# Patient Record
Sex: FEMALE | Race: WHITE | NOT HISPANIC OR LATINO | Employment: FULL TIME | URBAN - METROPOLITAN AREA
[De-identification: names, ages, dates, MRNs, and addresses within clinical notes are randomized per-mention and may not be internally consistent; named-entity substitution may affect disease eponyms.]

---

## 2020-02-12 ENCOUNTER — TREATMENT (OUTPATIENT)
Dept: PHYSICAL THERAPY | Facility: CLINIC | Age: 45
End: 2020-02-12

## 2020-02-12 ENCOUNTER — TRANSCRIBE ORDERS (OUTPATIENT)
Dept: PHYSICAL THERAPY | Facility: CLINIC | Age: 45
End: 2020-02-12

## 2020-02-12 DIAGNOSIS — S39.012A STRAIN OF LUMBAR REGION, INITIAL ENCOUNTER: Primary | ICD-10-CM

## 2020-02-12 DIAGNOSIS — Z98.1 S/P LUMBAR FUSION: Primary | ICD-10-CM

## 2020-02-12 DIAGNOSIS — S39.012A STRAIN OF LUMBAR REGION, INITIAL ENCOUNTER: ICD-10-CM

## 2020-02-12 PROCEDURE — 97162 PT EVAL MOD COMPLEX 30 MIN: CPT | Performed by: PHYSICAL THERAPIST

## 2020-02-12 PROCEDURE — 97110 THERAPEUTIC EXERCISES: CPT | Performed by: PHYSICAL THERAPIST

## 2020-02-12 NOTE — PROGRESS NOTES
Physical Therapy Initial Evaluation and Plan of Care      Patient: Olinda Mc   : 1975  Diagnosis/ICD-10 Code:  Strain of lumbar region, initial encounter [S39.012A]  Referring practitioner: Graeme Lopez, *  Date of Initial Visit: 2020  Today's Date: 2020  Patient seen for 1 sessions           Subjective Questionnaire: Oswestry: 40%      Subjective Evaluation    History of Present Illness  Date of surgery: 10/11/2019  Mechanism of injury: Patient presents to physical therapy s/p lumbar fusion of L5-S1.  Patient reports chronic pain in lumbar spine for the past ~20 years.  Reports multiple injuries to lower back, however no fractures.  Patient reports that after lumbar fusion, she no longer had radiating symptoms.  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  Reports that she is able to manage pain at this time with just tylenol.  Reports that she is having difficulty sleeping due to pain.  Wishes to decrease pain in lower back so that she may continue working with decreased limitation.     Pain  Current pain ratin  At worst pain ratin  Quality: tight  Relieving factors: medications, change in position and heat  Aggravating factors: sleeping, standing, prolonged positioning and squatting  Progression: worsening    Patient Goals  Patient goals for therapy: decreased pain, return to sport/leisure activities and return to work  Patient goal: Return to work related activities without pain.            Objective       Postural Observations  Seated posture: fair  Standing posture: fair  Correction of posture: makes symptoms better        Active Range of Motion     Additional Active Range of Motion Details  Lumbar AROM:    25% limited with extension and flexion.   R/L SB and rotation wnl with range, however pain at end range of right rotation    Strength/Myotome Testing     Lumbar   Left    Normal strength    Right Hip   Planes of Motion   Flexion: 5  Extension: 5  Abduction: 5  Adduction: 5  External rotation: 5  Internal rotation: 5    Right Knee   Flexion: 5  Extension: 5    Right Ankle/Foot   Dorsiflexion: 5  Great toe extension: 4+    Functional Assessment     Comments  SLS (L):  20 seconds   SLS (R):  20 seconds     Proper squat technique noted.     No trendelenburg noted with gait.  Mild genu valgum noted bilaterally during gait.          Assessment & Plan     Assessment  Impairments: impaired physical strength, lacks appropriate home exercise program and pain with function  Assessment details: Patient presents to physical therapy s/p lumbar fusion of L5-S1.  Patient demonstrates mild restrictions in lumbar AROM and deficits in muscular strength in BLE's.  Patient's pain is located in lumbar spine and in proximal right hip at this time.  Patient is appropriate for physical therapy intervention in order to address these deficits so that patient may complete work activities with less pain and limitation.   Prognosis: good  Functional Limitations: lifting, sitting and standing  Goals  Plan Goals: In two weeks, patient will report at least 25% reduction in pain level.    In two weeks, patient will demonstrate at least 25% improvement in AROM in lumbar spine.     In four weeks, patient will demonstrate proper technique with I HEP.  In four weeks, patient will demonstrate lumbar AROM WFL without pain level over 1/10.  In four weeks, patient will demonstrate decreased perceived disability by decreasing score on Oswestry by at least 12%.    Plan  Therapy options: will be seen for skilled physical therapy services  Planned modality interventions: thermotherapy (hydrocollator packs), cryotherapy and TENS  Planned therapy interventions: abdominal trunk stabilization, soft tissue mobilization, neuromuscular re-education, strengthening, therapeutic activities, stretching, home exercise program and  functional ROM exercises  Frequency: 1x week  Duration in weeks: 6        Manual Therapy:         mins  22410;  Therapeutic Exercise:    25     mins  21763;     Neuromuscular Brett:        mins  53675;    Therapeutic Activity:          mins  63208;     Gait Training:           mins  62876;     Ultrasound:          mins  74355;    Electrical Stimulation:         mins  87782 ( );  Dry Needling          mins self-pay    Timed Treatment:   25   mins   Total Treatment:     55   mins    PT SIGNATURE: Danny Arenas, MOISES   DATE TREATMENT INITIATED: 2/12/2020    Initial Certification  Certification Period: 5/12/2020  I certify that the therapy services are furnished while this patient is under my care.  The services outlined above are required by this patient, and will be reviewed every 90 days.     PHYSICIAN: Graeme Lopez MD      DATE:     Please sign and return via fax to [phone].. Thank you, [facility].

## 2020-02-20 ENCOUNTER — TREATMENT (OUTPATIENT)
Dept: PHYSICAL THERAPY | Facility: CLINIC | Age: 45
End: 2020-02-20

## 2020-02-20 DIAGNOSIS — S39.012A STRAIN OF LUMBAR REGION, INITIAL ENCOUNTER: Primary | ICD-10-CM

## 2020-02-20 PROCEDURE — 97110 THERAPEUTIC EXERCISES: CPT | Performed by: PHYSICAL THERAPIST

## 2020-02-20 PROCEDURE — 97112 NEUROMUSCULAR REEDUCATION: CPT | Performed by: PHYSICAL THERAPIST

## 2020-02-20 PROCEDURE — 97140 MANUAL THERAPY 1/> REGIONS: CPT | Performed by: PHYSICAL THERAPIST

## 2020-02-20 NOTE — PROGRESS NOTES
Physical Therapy Daily Progress Note      Patient: Olinda Mc   : 1975  Diagnosis/ICD-10 Code:  Strain of lumbar region, initial encounter [S39.012A]  Referring practitioner: Graeme Lopez, *  Date of Initial Visit: Type: THERAPY  Noted: 2020  Today's Date: 2020  Patient seen for 2 sessions         Olinda Mc reports:  Decreased pain in lumbar spine when completing abdominal stabilization exercises.  Still having pain in lower back at end of work day.     Objective   See Exercise, Manual, and Modality Logs for complete treatment.       Assessment/Plan     Tolerates exercise progression and manual therapy well this treatment.  Progressing well. Will follow up in two weeks if needed.     Progress per Plan of Care           Manual Therapy:    10     mins  12909;  Therapeutic Exercise:    15     mins  38195;     Neuromuscular Brett:    10    mins  74913;    Therapeutic Activity:          mins  76000;     Gait Training:           mins  46231;     Ultrasound:          mins  70197;    Electrical Stimulation:         mins  21166 ( );  Dry Needling          mins self-pay    Timed Treatment:   35   mins   Total Treatment:     35   mins    Danny Arenas PT  Physical Therapist